# Patient Record
Sex: FEMALE | Race: WHITE | NOT HISPANIC OR LATINO | Employment: FULL TIME | URBAN - METROPOLITAN AREA
[De-identification: names, ages, dates, MRNs, and addresses within clinical notes are randomized per-mention and may not be internally consistent; named-entity substitution may affect disease eponyms.]

---
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
## 2018-03-15 ENCOUNTER — HISTORICAL (OUTPATIENT)
Dept: RADIOLOGY | Facility: HOSPITAL | Age: 18
End: 2018-03-15

## 2018-05-02 ENCOUNTER — HISTORICAL (OUTPATIENT)
Dept: RADIOLOGY | Facility: HOSPITAL | Age: 18
End: 2018-05-02

## 2018-05-02 LAB
ABS NEUT (OLG): 3.64 X10(3)/MCL (ref 2.1–9.2)
ALBUMIN SERPL-MCNC: 3.7 GM/DL (ref 3.4–5)
ALBUMIN/GLOB SERPL: 1 {RATIO}
ALP SERPL-CCNC: 41 UNIT/L (ref 45–117)
ALT SERPL-CCNC: 17 UNIT/L (ref 13–56)
APPEARANCE, UA: CLEAR
AST SERPL-CCNC: 13 UNIT/L (ref 15–37)
BASOPHILS # BLD AUTO: 0.03 X10(3)/MCL (ref 0–0.2)
BASOPHILS NFR BLD AUTO: 0.5 % (ref 0–1)
BILIRUB SERPL-MCNC: 0.3 MG/DL (ref 0.2–1)
BILIRUB UR QL STRIP: NEGATIVE
BILIRUBIN DIRECT+TOT PNL SERPL-MCNC: 0.1 MG/DL (ref 0–0.2)
BILIRUBIN DIRECT+TOT PNL SERPL-MCNC: 0.2 MG/DL (ref 0–1)
BUN SERPL-MCNC: 17 MG/DL (ref 7–18)
CALCIUM SERPL-MCNC: 8.9 MG/DL (ref 8.5–10.1)
CHLORIDE SERPL-SCNC: 108 MMOL/L (ref 98–107)
CO2 SERPL-SCNC: 28 MMOL/L (ref 21–32)
COLOR UR: YELLOW
CREAT SERPL-MCNC: 1.21 MG/DL (ref 0.55–1.02)
EOSINOPHIL # BLD AUTO: 0.18 X10(3)/MCL (ref 0–0.9)
EOSINOPHIL NFR BLD AUTO: 2.9 % (ref 0–6.4)
ERYTHROCYTE [DISTWIDTH] IN BLOOD BY AUTOMATED COUNT: 12.1 % (ref 11.5–17)
ERYTHROCYTE [SEDIMENTATION RATE] IN BLOOD: 4 MM/HR (ref 0–20)
GLOBULIN SER-MCNC: 3 GM/DL (ref 2–4)
GLUCOSE (UA): NEGATIVE
GLUCOSE SERPL-MCNC: 81 MG/DL (ref 74–106)
HCT VFR BLD AUTO: 38.3 % (ref 37–47)
HGB BLD-MCNC: 13.4 GM/DL (ref 12–16)
HGB UR QL STRIP: NEGATIVE
IMM GRANULOCYTES # BLD AUTO: 0.01 10*3/UL (ref 0–0.02)
IMM GRANULOCYTES NFR BLD AUTO: 0.2 % (ref 0–0.43)
KETONES UR QL STRIP: NEGATIVE
LEUKOCYTE ESTERASE UR QL STRIP: NEGATIVE
LYMPHOCYTES # BLD AUTO: 2 X10(3)/MCL (ref 0.6–4.6)
LYMPHOCYTES NFR BLD AUTO: 32 % (ref 16–44)
MCH RBC QN AUTO: 31.6 PG (ref 27–31)
MCHC RBC AUTO-ENTMCNC: 35 GM/DL (ref 33–36)
MCV RBC AUTO: 90.3 FL (ref 80–94)
MONOCYTES # BLD AUTO: 0.39 X10(3)/MCL (ref 0.1–1.3)
MONOCYTES NFR BLD AUTO: 6.2 % (ref 4–12.1)
NEUTROPHILS # BLD AUTO: 3.64 X10(3)/MCL (ref 2.1–9.2)
NEUTROPHILS NFR BLD AUTO: 58.2 % (ref 43–73)
NITRITE UR QL STRIP: NEGATIVE
NRBC BLD AUTO-RTO: 0 % (ref 0–0.2)
PH UR STRIP: 7 [PH] (ref 5–7)
PLATELET # BLD AUTO: 221 X10(3)/MCL (ref 130–400)
PMV BLD AUTO: 10 FL (ref 7.4–10.4)
POTASSIUM SERPL-SCNC: 4.2 MMOL/L (ref 3.5–5.1)
PROT SERPL-MCNC: 7.1 GM/DL (ref 6.4–8.2)
PROT UR QL STRIP: NEGATIVE
PTH-INTACT SERPL-MCNC: 37.7 PG/ML (ref 18.4–80.1)
RBC # BLD AUTO: 4.24 X10(6)/MCL (ref 4.2–5.4)
SODIUM SERPL-SCNC: 142 MMOL/L (ref 136–145)
SP GR UR STRIP: 1.01 (ref 1–1.03)
UROBILINOGEN UR STRIP-ACNC: NEGATIVE
WBC # SPEC AUTO: 6.2 X10(3)/MCL (ref 4.5–11.5)

## 2018-07-16 ENCOUNTER — HISTORICAL (OUTPATIENT)
Dept: ADMINISTRATIVE | Facility: HOSPITAL | Age: 18
End: 2018-07-16

## 2018-07-16 LAB — B-HCG SERPL QL: NEGATIVE

## 2018-08-03 ENCOUNTER — HISTORICAL (OUTPATIENT)
Dept: RADIOLOGY | Facility: HOSPITAL | Age: 18
End: 2018-08-03

## 2018-11-09 ENCOUNTER — HISTORICAL (OUTPATIENT)
Dept: RADIOLOGY | Facility: HOSPITAL | Age: 18
End: 2018-11-09

## 2018-11-09 LAB
ABS NEUT (OLG): 4.34 X10(3)/MCL (ref 2.1–9.2)
ALBUMIN SERPL-MCNC: 3.8 GM/DL (ref 3.4–5)
ALBUMIN/GLOB SERPL: 1 {RATIO}
ALP SERPL-CCNC: 57 UNIT/L (ref 45–117)
ALT SERPL-CCNC: 25 UNIT/L (ref 13–56)
APPEARANCE, UA: CLEAR
AST SERPL-CCNC: 22 UNIT/L (ref 15–37)
BILIRUB SERPL-MCNC: 0.2 MG/DL (ref 0.2–1)
BILIRUB UR QL STRIP: NEGATIVE
BILIRUBIN DIRECT+TOT PNL SERPL-MCNC: <0.1 MG/DL (ref 0–0.2)
BILIRUBIN DIRECT+TOT PNL SERPL-MCNC: >0.1 MG/DL (ref 0–1)
BUN SERPL-MCNC: 16 MG/DL (ref 7–18)
CALCIUM SERPL-MCNC: 8.9 MG/DL (ref 8.5–10.1)
CHLORIDE SERPL-SCNC: 103 MMOL/L (ref 98–107)
CO2 SERPL-SCNC: 28 MMOL/L (ref 21–32)
COLOR UR: ABNORMAL
CREAT SERPL-MCNC: 1.09 MG/DL (ref 0.55–1.02)
DEPRECATED CALCIDIOL+CALCIFEROL SERPL-MC: 28.92 NG/ML (ref 30–80)
ERYTHROCYTE [DISTWIDTH] IN BLOOD BY AUTOMATED COUNT: 11.9 % (ref 11.5–17)
GLOBULIN SER-MCNC: 4 GM/DL (ref 2–4)
GLUCOSE (UA): NEGATIVE
GLUCOSE SERPL-MCNC: 107 MG/DL (ref 74–106)
HCT VFR BLD AUTO: 39.6 % (ref 37–47)
HGB BLD-MCNC: 13.9 GM/DL (ref 12–16)
HGB UR QL STRIP: NEGATIVE
KETONES UR QL STRIP: NEGATIVE
LEUKOCYTE ESTERASE UR QL STRIP: NEGATIVE
MCH RBC QN AUTO: 31.6 PG (ref 27–31)
MCHC RBC AUTO-ENTMCNC: 35.1 GM/DL (ref 33–36)
MCV RBC AUTO: 90 FL (ref 80–94)
NITRITE UR QL STRIP: NEGATIVE
PH UR STRIP: 8.5 [PH] (ref 5–7)
PLATELET # BLD AUTO: 268 X10(3)/MCL (ref 130–400)
PMV BLD AUTO: 9.6 FL (ref 7.4–10.4)
POTASSIUM SERPL-SCNC: 3.8 MMOL/L (ref 3.5–5.1)
PROT SERPL-MCNC: 7.8 GM/DL (ref 6.4–8.2)
PROT UR QL STRIP: NEGATIVE
RBC # BLD AUTO: 4.4 X10(6)/MCL (ref 4.2–5.4)
SODIUM SERPL-SCNC: 137 MMOL/L (ref 136–145)
SP GR UR STRIP: 1.01 (ref 1–1.03)
URATE SERPL-MCNC: 4.2 MG/DL (ref 2.6–6)
UROBILINOGEN UR STRIP-ACNC: NEGATIVE
WBC # SPEC AUTO: 6.9 X10(3)/MCL (ref 4.5–11.5)

## 2018-11-11 ENCOUNTER — HISTORICAL (OUTPATIENT)
Dept: LAB | Facility: HOSPITAL | Age: 18
End: 2018-11-11

## 2018-11-11 LAB
CALCIUM 24H UR-MCNC: 410 MG/24HR (ref 100–300)
MAGNESIUM 24H UR-MCNC: 139.4 MG/DL
MAGNESIUM UR-MCNC: 3.4 MG/DL
PHOSPHATE 24H UR-MCNC: 1242.3 MG/24HR (ref 400–1300)
PHOSPHATE UR-MCNC: 30.3 MG/DL
URATE 24H UR-MCNC: 697 MG/24HR (ref 250–750)
URATE UR-MCNC: 17 MG/DL

## 2018-12-05 ENCOUNTER — HISTORICAL (OUTPATIENT)
Dept: RADIOLOGY | Facility: HOSPITAL | Age: 18
End: 2018-12-05

## 2019-02-18 ENCOUNTER — HISTORICAL (OUTPATIENT)
Dept: LAB | Facility: HOSPITAL | Age: 19
End: 2019-02-18

## 2019-02-18 LAB
APPEARANCE, UA: CLEAR
BACTERIA SPEC CULT: ABNORMAL /HPF
BILIRUB UR QL STRIP: NEGATIVE
BUN SERPL-MCNC: 18 MG/DL (ref 7–18)
CALCIUM 24H UR-MCNC: 172.5 MG/24HR (ref 100–300)
CALCIUM SERPL-MCNC: 9.1 MG/DL (ref 8.5–10.1)
CHLORIDE SERPL-SCNC: 107 MMOL/L (ref 98–107)
CO2 SERPL-SCNC: 28 MMOL/L (ref 21–32)
COLOR UR: ABNORMAL
CREAT SERPL-MCNC: 1.14 MG/DL (ref 0.55–1.02)
CREAT/UREA NIT SERPL: 16
GLUCOSE (UA): NEGATIVE
GLUCOSE SERPL-MCNC: 68 MG/DL (ref 74–106)
HGB UR QL STRIP: NEGATIVE
KETONES UR QL STRIP: NEGATIVE
LEUKOCYTE ESTERASE UR QL STRIP: ABNORMAL
NITRITE UR QL STRIP: NEGATIVE
PH UR STRIP: 7.5 [PH] (ref 5–7)
POTASSIUM SERPL-SCNC: 3.8 MMOL/L (ref 3.5–5.1)
PROT UR QL STRIP: NEGATIVE
PTH-INTACT SERPL-MCNC: 23.6 PG/ML (ref 18.4–80.1)
RBC #/AREA URNS HPF: 0 /[HPF]
SODIUM SERPL-SCNC: 141 MMOL/L (ref 136–145)
SP GR UR STRIP: 1.01 (ref 1–1.03)
SQUAMOUS EPITHELIAL, UA: ABNORMAL /LPF
URATE 24H UR-MCNC: 623.9 MG/24HR (ref 250–750)
URATE UR-MCNC: 21.7 MG/DL
UROBILINOGEN UR STRIP-ACNC: NEGATIVE
WBC #/AREA URNS HPF: ABNORMAL /HPF

## 2019-02-20 LAB
FINAL CULTURE: NO GROWTH
FINAL CULTURE: NO GROWTH

## 2020-07-14 ENCOUNTER — HISTORICAL (OUTPATIENT)
Dept: LAB | Facility: HOSPITAL | Age: 20
End: 2020-07-14

## 2020-07-14 LAB — SARS-COV-2 RNA RESP QL NAA+PROBE: DETECTED

## 2021-05-25 LAB
INFLUENZA A ANTIGEN, POC: NEGATIVE
INFLUENZA B ANTIGEN, POC: NEGATIVE
SARS-COV-2 RNA RESP QL NAA+PROBE: NEGATIVE

## 2021-10-26 ENCOUNTER — HISTORICAL (OUTPATIENT)
Dept: RADIOLOGY | Facility: HOSPITAL | Age: 21
End: 2021-10-26

## 2022-02-21 ENCOUNTER — HISTORICAL (OUTPATIENT)
Dept: PREADMISSION TESTING | Facility: HOSPITAL | Age: 22
End: 2022-02-21

## 2022-02-22 LAB — SARS-COV-2 RNA RESP QL NAA+PROBE: NOT DETECTED

## 2022-02-24 ENCOUNTER — HISTORICAL (OUTPATIENT)
Dept: ADMINISTRATIVE | Facility: HOSPITAL | Age: 22
End: 2022-02-24

## 2022-04-07 ENCOUNTER — HISTORICAL (OUTPATIENT)
Dept: ADMINISTRATIVE | Facility: HOSPITAL | Age: 22
End: 2022-04-07
Payer: COMMERCIAL

## 2022-04-24 VITALS
HEIGHT: 63 IN | OXYGEN SATURATION: 99 % | DIASTOLIC BLOOD PRESSURE: 84 MMHG | SYSTOLIC BLOOD PRESSURE: 124 MMHG | BODY MASS INDEX: 24.8 KG/M2 | WEIGHT: 140 LBS

## 2022-05-14 NOTE — OP NOTE
DATE OF SURGERY:    02/24/2022    SURGEON:  Yenifer Child MD    PREOPERATIVE DIAGNOSES:    1. Nasal obstruction.  2. Nasal septal deviation.  3. Bilateral inferior turbinate hyperplasia.    POSTOPERATIVE DIAGNOSES:    1. Nasal obstruction.  2. Nasal septal deviation.  3. Bilateral inferior turbinate hyperplasia.    PROCEDURES:    1. Septoplasty.  2. Intramural reduction of the inferior turbinates, and turbinate outfracture.    INDICATIONS FOR PROCEDURE:  This is a 22-year-old patient with a history of chronic nasal obstruction.  The patient reports that the obstruction is affecting her daily activities and limiting her ability to exercise due to not being able to breathe through her nose.  She states that the inside of her nose feels obstructed on a chronic basis.  She has attempted medical management with Flonase, Zyrtec, and over-the-counter antihistamines.  The patient reports despite maximal medical management, she continues to have difficulty breathing through the nose.  The patient underwent a CT scan of the sinuses, which was remarkable for nasal septal deviation and bilateral inferior turbinate hyperplasia.  The patient requested definitive management.    ANESTHESIA:  General endotracheal.    ESTIMATED BLOOD LOSS:  Minimal.    COMPLICATIONS:  None.    SPECIMENS TO PATHOLOGY:  Included septal cartilage.    PROCEDURE IN DETAIL:  On the preoperative evaluation, all risks and benefits of the procedure were explained to the patient.  All questions were answered.  Nonsurgical intervention was also entertained.  The patient indicated an understanding of the procedure.  All parties agreed to proceed with surgical intervention.  Informed consent was thus obtained.  __________ consent was also obtained.  Patient was COVID tested negative prior to presenting to the operative suite.     The patient was brought to the operating room, placed supine on the operative table, and underwent general endotracheal  intubation without event.  Lidocaine with epinephrine was injected into the septum and inferior turbinates bilaterally.  Afrin-soaked nasal pledgets were placed bilaterally for vasoconstriction.     A 15 blade was used to make a Richard incision on the patient's right mucocutaneous junction.  A [place] elevator was used to elevate a mucoperiosteal flap.  A 15 blade was used to make a vertical incision through the septum and a mucoperiosteal flap was elevated on the opposite side as well.  A Fomon scissors was used to make a superior and inferior incision, and a swivel knife was used to remove the deviated portion of septal cartilage.  A 4 mm chisel and a mallet were used to remove an inferior septal spur on the patient's left nostril.  Once the septal obstruction was alleviated, a 4-0 chromic in an interrupted fashion was used to close the Richard incision, and a __________ stitch in mattress fashion was used to reapproximate the mucoperiosteal flaps.  Attention was then focused on the inferior turbinates.     Bilateral inferior turbinates were outfractured.  A 15 blade was used to make an anteroinferior incision on both inferior turbinates.  A [place] elevator was used to elevate a mucoperiosteal flap.  A 2.7 mm shaver was applied to the internal cortex of the inferior turbinates bilaterally for intramural reduction of the inferior turbinates.  Afrin-soaked nasal pledgets were then placed bilaterally for vasoconstriction.     Patient tolerated all procedures well with no complications, was extubated in the operating room and transferred stable to the recovery room.        ______________________________  MD JAQUELIN Ruelas/DEJON  DD:  02/24/2022  Time:  08:57AM  DT:  02/24/2022  Time:  09:15AM  Job #:  [id number]

## 2022-09-22 ENCOUNTER — HISTORICAL (OUTPATIENT)
Dept: ADMINISTRATIVE | Facility: HOSPITAL | Age: 22
End: 2022-09-22
Payer: COMMERCIAL